# Patient Record
Sex: FEMALE | URBAN - METROPOLITAN AREA
[De-identification: names, ages, dates, MRNs, and addresses within clinical notes are randomized per-mention and may not be internally consistent; named-entity substitution may affect disease eponyms.]

---

## 2021-02-25 ENCOUNTER — OFFICE VISIT (OUTPATIENT)
Dept: URBAN - METROPOLITAN AREA CLINIC 18 | Facility: CLINIC | Age: 21
End: 2021-02-25
Payer: COMMERCIAL

## 2021-02-25 DIAGNOSIS — H00.12 CHALAZION RIGHT LOWER EYELID: Primary | ICD-10-CM

## 2021-02-25 PROCEDURE — 92002 INTRM OPH EXAM NEW PATIENT: CPT | Performed by: OPTOMETRIST

## 2021-02-25 RX ORDER — DOXYCYCLINE HYCLATE 100 MG/1
100 MG TABLET, COATED ORAL
Qty: 42 | Refills: 0 | Status: INACTIVE
Start: 2021-02-25 | End: 2021-04-02

## 2021-02-25 ASSESSMENT — INTRAOCULAR PRESSURE
OD: 15
OS: 14

## 2021-02-25 NOTE — IMPRESSION/PLAN
Impression: Chalazion right lower eyelid: H00.12. Plan: Discussed diagnosis in detail with patient. Discussed treatment options with patient. Advised patient of condition. Will continue to observe condition and or symptoms. New medication(s) Rx given today. Doxycycline 100 MG BID for 3 wks then D/C. Pt to start warm compresses BID for 3 wks also.

## 2021-04-29 ENCOUNTER — OFFICE VISIT (OUTPATIENT)
Dept: URBAN - METROPOLITAN AREA CLINIC 18 | Facility: CLINIC | Age: 21
End: 2021-04-29
Payer: COMMERCIAL

## 2021-04-29 PROCEDURE — 92012 INTRM OPH EXAM EST PATIENT: CPT | Performed by: OPTOMETRIST

## 2021-04-29 RX ORDER — HYPROMELLOSE 0 G/G
0.3 % GEL OPHTHALMIC
Qty: 3.5 | Refills: 0 | Status: ACTIVE
Start: 2021-04-29

## 2021-04-29 NOTE — IMPRESSION/PLAN
Impression: Chalazion right lower eyelid: H00.12 Right. Plan: Discussed condition w/pt, improving today. finish doxycycline. Begin AT's prn and continue warm compresses. RTC if not better in 1-2 weeks, otherwise, RTC 1 year.